# Patient Record
Sex: MALE | Race: WHITE | HISPANIC OR LATINO | URBAN - METROPOLITAN AREA
[De-identification: names, ages, dates, MRNs, and addresses within clinical notes are randomized per-mention and may not be internally consistent; named-entity substitution may affect disease eponyms.]

---

## 2021-04-01 ENCOUNTER — OUTPATIENT (OUTPATIENT)
Dept: OUTPATIENT SERVICES | Facility: HOSPITAL | Age: 21
LOS: 1 days | End: 2021-04-01
Payer: MEDICARE

## 2021-04-01 DIAGNOSIS — Z20.828 CONTACT WITH AND (SUSPECTED) EXPOSURE TO OTHER VIRAL COMMUNICABLE DISEASES: ICD-10-CM

## 2021-04-01 LAB — SARS-COV-2 RNA SPEC QL NAA+PROBE: SIGNIFICANT CHANGE UP

## 2021-04-01 PROCEDURE — C9803: CPT

## 2021-04-01 PROCEDURE — U0005: CPT

## 2021-04-01 PROCEDURE — U0003: CPT

## 2021-04-02 DIAGNOSIS — Z20.828 CONTACT WITH AND (SUSPECTED) EXPOSURE TO OTHER VIRAL COMMUNICABLE DISEASES: ICD-10-CM

## 2021-08-20 PROBLEM — Z00.00 ENCOUNTER FOR PREVENTIVE HEALTH EXAMINATION: Status: ACTIVE | Noted: 2021-08-20

## 2021-08-23 ENCOUNTER — NON-APPOINTMENT (OUTPATIENT)
Age: 21
End: 2021-08-23

## 2021-08-23 ENCOUNTER — APPOINTMENT (OUTPATIENT)
Dept: DERMATOLOGY | Facility: CLINIC | Age: 21
End: 2021-08-23
Payer: MEDICAID

## 2021-08-23 PROCEDURE — 99204 OFFICE O/P NEW MOD 45 MIN: CPT

## 2021-08-23 NOTE — ASSESSMENT
[FreeTextEntry1] : Acne\par prominent closed comedonal component;\par \par Therapeutic options and their risks and benefits; along with multiple diagnostic possibilities were discussed at length; risks and benefits of further study were discussed;\par \par Risks and benefits of minocycline were discussed including dizziness;\par  BID;  tretinoin 0.05 cream qHS;  \par \par f/u 6-8 wks;  t/c acne surgery; \par

## 2021-09-03 ENCOUNTER — NON-APPOINTMENT (OUTPATIENT)
Age: 21
End: 2021-09-03

## 2021-10-04 ENCOUNTER — APPOINTMENT (OUTPATIENT)
Dept: DERMATOLOGY | Facility: CLINIC | Age: 21
End: 2021-10-04
Payer: MEDICAID

## 2021-10-04 VITALS — BODY MASS INDEX: 21.47 KG/M2 | HEIGHT: 70 IN | WEIGHT: 150 LBS

## 2021-10-04 DIAGNOSIS — Z78.9 OTHER SPECIFIED HEALTH STATUS: ICD-10-CM

## 2021-10-04 PROCEDURE — 99214 OFFICE O/P EST MOD 30 MIN: CPT

## 2021-10-04 NOTE — ASSESSMENT
[FreeTextEntry1] : Acne;  improved, but still active\par \par Therapeutic options and their risks and benefits; along with multiple diagnostic possibilities were discussed at length; risks and benefits of further study were discussed;\par \par proper use of tretinoin discussed;  continue MCN; \par f/u 6-8 wks; repeat Tx; \par \par

## 2021-10-04 NOTE — HISTORY OF PRESENT ILLNESS
[de-identified] : f/u for check of acne; \par on MCN BID, tretinoin 0.05; (variable)\par still c/o activity;

## 2021-11-17 ENCOUNTER — APPOINTMENT (OUTPATIENT)
Dept: DERMATOLOGY | Facility: CLINIC | Age: 21
End: 2021-11-17
Payer: MEDICAID

## 2021-11-17 PROCEDURE — 99214 OFFICE O/P EST MOD 30 MIN: CPT

## 2021-11-17 NOTE — ASSESSMENT
[FreeTextEntry1] : Acne;  improved, but still active\par \par Therapeutic options and their risks and benefits; along with multiple diagnostic possibilities were discussed at length; risks and benefits of further study were discussed;\par \par  continue MCN; tretinoin;\par comedones decreasing, PIH will fade\par \par f/u 6-8 wks; repeat Tx; \par \par

## 2022-01-09 ENCOUNTER — OUTPATIENT (OUTPATIENT)
Dept: OUTPATIENT SERVICES | Facility: HOSPITAL | Age: 22
LOS: 1 days | End: 2022-01-09
Payer: MEDICAID

## 2022-01-09 DIAGNOSIS — Z20.828 CONTACT WITH AND (SUSPECTED) EXPOSURE TO OTHER VIRAL COMMUNICABLE DISEASES: ICD-10-CM

## 2022-01-09 LAB — SARS-COV-2 RNA SPEC QL NAA+PROBE: SIGNIFICANT CHANGE UP

## 2022-01-09 PROCEDURE — C9803: CPT

## 2022-01-09 PROCEDURE — U0005: CPT

## 2022-01-09 PROCEDURE — U0003: CPT

## 2022-01-10 DIAGNOSIS — Z20.828 CONTACT WITH AND (SUSPECTED) EXPOSURE TO OTHER VIRAL COMMUNICABLE DISEASES: ICD-10-CM

## 2022-01-26 ENCOUNTER — APPOINTMENT (OUTPATIENT)
Dept: DERMATOLOGY | Facility: CLINIC | Age: 22
End: 2022-01-26
Payer: MEDICAID

## 2022-01-26 PROCEDURE — 99213 OFFICE O/P EST LOW 20 MIN: CPT

## 2022-01-26 NOTE — ASSESSMENT
[FreeTextEntry1] : Acne;  markedly improved\par \par Therapeutic options and their risks and benefits; along with multiple diagnostic possibilities were discussed at length; risks and benefits of further study were discussed;\par \par  continue MCN; tretinoin;\par reduce MCN to 100/d;  \par f/u Spring;  may discuss keep at low dose MCN, tretinoin vs. replace w/topical\par \par \par \par

## 2022-03-02 ENCOUNTER — RX RENEWAL (OUTPATIENT)
Age: 22
End: 2022-03-02

## 2022-04-27 ENCOUNTER — APPOINTMENT (OUTPATIENT)
Dept: DERMATOLOGY | Facility: CLINIC | Age: 22
End: 2022-04-27
Payer: MEDICAID

## 2022-04-27 PROCEDURE — 99214 OFFICE O/P EST MOD 30 MIN: CPT

## 2022-04-27 NOTE — ASSESSMENT
[FreeTextEntry1] : Acne;  markedly improved\par \par Therapeutic options and their risks and benefits; along with multiple diagnostic possibilities were discussed at length; risks and benefits of further study were discussed;\par \par  continue MCN; tretinoin;\par continue MCN to 100/d;  \par f/u 3-4mo ;  may discuss keep at low dose MCN, tretinoin vs. replace w/topical\par \par Watch shaving in neck area, may be having shaving folliculitis overlap w/acne\par \par \par \par

## 2022-04-27 NOTE — PHYSICAL EXAM
[FreeTextEntry3] :  inflammatory acne;\par less comedones than prior- faded PIH\par some active lesions on neck, under jawline

## 2022-04-27 NOTE — HISTORY OF PRESENT ILLNESS
[de-identified] : f/u for check of acne; \par on MCN qd, occasionally BID; tretinoin 0.05; \par doing much better- although c/o recent outbreaks on neck area

## 2022-07-27 ENCOUNTER — APPOINTMENT (OUTPATIENT)
Dept: DERMATOLOGY | Facility: CLINIC | Age: 22
End: 2022-07-27

## 2022-07-27 PROCEDURE — 99214 OFFICE O/P EST MOD 30 MIN: CPT

## 2022-07-27 NOTE — ASSESSMENT
[FreeTextEntry1] : Acne;  markedly improved\par \par Therapeutic options and their risks and benefits; along with multiple diagnostic possibilities were discussed at length; risks and benefits of further study were discussed;\par \par  continue tretinoin;\par continue /d;  \par f/u 3-4mo ;  discussed taking trials off MCN as needed; may discuss keep at low dose MCN, tretinoin vs. replace w/topical\par \par

## 2022-07-27 NOTE — HISTORY OF PRESENT ILLNESS
[de-identified] : f/u for check of acne; \par on MCN qd, occasionally BID; tretinoin 0.05; \par doing much better-

## 2022-08-01 ENCOUNTER — RX RENEWAL (OUTPATIENT)
Age: 22
End: 2022-08-01

## 2022-12-02 ENCOUNTER — APPOINTMENT (OUTPATIENT)
Dept: DERMATOLOGY | Facility: CLINIC | Age: 22
End: 2022-12-02

## 2022-12-02 VITALS — HEIGHT: 70 IN | WEIGHT: 150 LBS | BODY MASS INDEX: 21.47 KG/M2

## 2022-12-02 PROCEDURE — 99213 OFFICE O/P EST LOW 20 MIN: CPT

## 2022-12-02 RX ORDER — AZITHROMYCIN 250 MG/1
250 TABLET, FILM COATED ORAL
Qty: 6 | Refills: 0 | Status: COMPLETED | COMMUNITY
Start: 2022-07-08

## 2022-12-02 RX ORDER — FLUTICASONE PROPIONATE 50 UG/1
50 SPRAY, METERED NASAL
Qty: 16 | Refills: 0 | Status: COMPLETED | COMMUNITY
Start: 2022-07-28

## 2022-12-02 RX ORDER — BENZONATATE 200 MG/1
200 CAPSULE ORAL
Qty: 30 | Refills: 0 | Status: COMPLETED | COMMUNITY
Start: 2022-07-01

## 2022-12-02 NOTE — HISTORY OF PRESENT ILLNESS
[de-identified] : f/u for check of acne; \par on MCN qd, occasionally BID; tretinoin 0.05; \par doing much better-

## 2022-12-02 NOTE — ASSESSMENT
[FreeTextEntry1] : Acne;  markedly improved\par \par Therapeutic options and their risks and benefits; along with multiple diagnostic possibilities were discussed at length; risks and benefits of further study were discussed;\par \par  continue tretinoin;\par continue /d;  \par f/u 3-4mo ; stable on current regimen; \par

## 2023-02-22 ENCOUNTER — RX RENEWAL (OUTPATIENT)
Age: 23
End: 2023-02-22

## 2023-04-21 ENCOUNTER — APPOINTMENT (OUTPATIENT)
Dept: DERMATOLOGY | Facility: CLINIC | Age: 23
End: 2023-04-21
Payer: MEDICAID

## 2023-04-21 PROCEDURE — 99214 OFFICE O/P EST MOD 30 MIN: CPT

## 2023-04-21 NOTE — HISTORY OF PRESENT ILLNESS
[de-identified] : f/u for check of acne; \par on MCN qd, occasionally BID; tretinoin 0.05; \par doing much better-

## 2023-07-04 ENCOUNTER — RX RENEWAL (OUTPATIENT)
Age: 23
End: 2023-07-04

## 2023-09-01 ENCOUNTER — APPOINTMENT (OUTPATIENT)
Dept: DERMATOLOGY | Facility: CLINIC | Age: 23
End: 2023-09-01
Payer: MEDICAID

## 2023-09-01 PROCEDURE — 99214 OFFICE O/P EST MOD 30 MIN: CPT

## 2023-09-01 RX ORDER — TRETINOIN 0.5 MG/G
0.05 CREAM TOPICAL
Qty: 1 | Refills: 3 | Status: ACTIVE | COMMUNITY
Start: 2023-09-01 | End: 1900-01-01

## 2023-09-01 RX ORDER — TRETINOIN 0.5 MG/G
0.05 CREAM TOPICAL
Qty: 20 | Refills: 3 | Status: DISCONTINUED | COMMUNITY
Start: 2021-08-23 | End: 2023-09-01

## 2023-09-01 NOTE — HISTORY OF PRESENT ILLNESS
[de-identified] : f/u for check of acne;  on MCN qd, occasionally BID; tretinoin 0.05;  doing much better-

## 2023-09-01 NOTE — ASSESSMENT
[FreeTextEntry1] : Acne;  markedly improved  Therapeutic options and their risks and benefits; along with multiple diagnostic possibilities were discussed at length; risks and benefits of further study were discussed;   continue tretinoin; (Retin A darrell) continue /d;  taking since 2021 f/u 3-4mo ; stable on current regimen;

## 2023-11-08 ENCOUNTER — RX RENEWAL (OUTPATIENT)
Age: 23
End: 2023-11-08

## 2024-01-13 ENCOUNTER — APPOINTMENT (OUTPATIENT)
Dept: DERMATOLOGY | Facility: CLINIC | Age: 24
End: 2024-01-13
Payer: MEDICAID

## 2024-01-13 VITALS — WEIGHT: 150 LBS | HEIGHT: 65 IN | BODY MASS INDEX: 24.99 KG/M2

## 2024-01-13 PROCEDURE — 99214 OFFICE O/P EST MOD 30 MIN: CPT

## 2024-01-13 NOTE — ASSESSMENT
[FreeTextEntry1] : Acne; markedly improved  Therapeutic options and their risks and benefits; along with multiple diagnostic possibilities were discussed at length; risks and benefits of further study were discussed;   continue tretinoin; (Retin TEENA ramírez) continue /d; taking since 2021 f/u 3-4mo ; stable on current regimen;.

## 2024-01-13 NOTE — HISTORY OF PRESENT ILLNESS
[de-identified] : f/u for check of acne;  on MCN qd, occasionally BID; tretinoin 0.05;  doing much better-

## 2024-02-09 ENCOUNTER — RX RENEWAL (OUTPATIENT)
Age: 24
End: 2024-02-09

## 2024-02-09 RX ORDER — MINOCYCLINE HYDROCHLORIDE 100 MG/1
100 CAPSULE ORAL
Qty: 60 | Refills: 2 | Status: ACTIVE | COMMUNITY
Start: 2021-08-23 | End: 1900-01-01

## 2024-03-14 ENCOUNTER — APPOINTMENT (OUTPATIENT)
Dept: DERMATOLOGY | Facility: CLINIC | Age: 24
End: 2024-03-14
Payer: MEDICAID

## 2024-03-14 DIAGNOSIS — L20.9 ATOPIC DERMATITIS, UNSPECIFIED: ICD-10-CM

## 2024-03-14 PROCEDURE — 99214 OFFICE O/P EST MOD 30 MIN: CPT

## 2024-03-14 RX ORDER — MOMETASONE FUROATE 1 MG/G
0.1 CREAM TOPICAL
Qty: 1 | Refills: 2 | Status: ACTIVE | COMMUNITY
Start: 2024-03-14 | End: 1900-01-01

## 2024-03-14 NOTE — HISTORY OF PRESENT ILLNESS
[de-identified] : The patient has been fit in for urgent appointment.  c/o itchy rash on right thigh, tried to use acne treatments but did not respond some on left

## 2024-03-14 NOTE — PHYSICAL EXAM
[FreeTextEntry3] : Right anterior thigh:  + Erythematous scaling patches with inflammation - follicular based  similar changes on left side

## 2024-03-14 NOTE — ASSESSMENT
[FreeTextEntry1] : Eczema/ LSC follicular component   Therapeutic options and their risks and benefits; along with multiple diagnostic possibilities were discussed at length; risks and benefits of further study were discussed;  mometasone cream BID x 1-2 weeks f/u if persists  has f/u in May to check acne;  t/c discussing isotretinoin for persistent face/back acne;

## 2024-05-22 ENCOUNTER — APPOINTMENT (OUTPATIENT)
Dept: DERMATOLOGY | Facility: CLINIC | Age: 24
End: 2024-05-22
Payer: COMMERCIAL

## 2024-05-22 DIAGNOSIS — L70.0 ACNE VULGARIS: ICD-10-CM

## 2024-05-22 PROCEDURE — 99215 OFFICE O/P EST HI 40 MIN: CPT

## 2024-05-22 NOTE — ASSESSMENT
[FreeTextEntry1] : Acne;  Persistent, on long term MCN;  Risks and benefits of  isotretinoin were discussed including dryness, arthralgias,, epistaxis, initial worsening of acne, and need for laboratory monitoring. Concerns regarding depression, suicide, and other psychological issues were discussed.   d/c MCN,  start isotretinoin @ 40 BID (wt 150 lb) discussed at length check labs 1 mo

## 2024-05-22 NOTE — HISTORY OF PRESENT ILLNESS
[de-identified] : f/u for check of acne;  on MCN qd, occasionally BID; tretinoin 0.05;  still with persistent outbreaks, especially on back inquiring re: other options

## 2024-06-10 RX ORDER — ISOTRETINOIN 40 MG/1
40 CAPSULE ORAL
Qty: 60 | Refills: 0 | Status: ACTIVE | COMMUNITY
Start: 2024-05-22

## 2024-06-21 ENCOUNTER — APPOINTMENT (OUTPATIENT)
Dept: DERMATOLOGY | Facility: CLINIC | Age: 24
End: 2024-06-21

## 2024-07-19 ENCOUNTER — APPOINTMENT (OUTPATIENT)
Dept: DERMATOLOGY | Facility: CLINIC | Age: 24
End: 2024-07-19
Payer: COMMERCIAL

## 2024-07-19 ENCOUNTER — TRANSCRIPTION ENCOUNTER (OUTPATIENT)
Age: 24
End: 2024-07-19

## 2024-07-19 DIAGNOSIS — L70.0 ACNE VULGARIS: ICD-10-CM

## 2024-07-19 PROCEDURE — 99214 OFFICE O/P EST MOD 30 MIN: CPT

## 2024-08-15 ENCOUNTER — NON-APPOINTMENT (OUTPATIENT)
Age: 24
End: 2024-08-15

## 2024-08-16 ENCOUNTER — APPOINTMENT (OUTPATIENT)
Dept: DERMATOLOGY | Facility: CLINIC | Age: 24
End: 2024-08-16
Payer: COMMERCIAL

## 2024-08-16 DIAGNOSIS — L70.0 ACNE VULGARIS: ICD-10-CM

## 2024-08-16 PROCEDURE — 99214 OFFICE O/P EST MOD 30 MIN: CPT

## 2024-08-16 NOTE — HISTORY OF PRESENT ILLNESS
[de-identified] : Isotretinoin visit:  The patient has been on a daily dose of: 80   mg for  2  months.  doing well, mild initial flare on forehead   having expected AE

## 2024-08-16 NOTE — ASSESSMENT
[FreeTextEntry1] : isotretinoin;  continue at 80/d mild initial flare Risks and benefits of  isotretinoin were discussed including dryness, arthralgias,, epistaxis, initial worsening of acne, and need for laboratory monitoring. Concerns regarding depression, suicide, and other psychological issues were discussed.  labs pending f/u 1 month, recheck labs  acne surgery to forehead, temples

## 2024-09-13 ENCOUNTER — APPOINTMENT (OUTPATIENT)
Dept: DERMATOLOGY | Facility: CLINIC | Age: 24
End: 2024-09-13
Payer: COMMERCIAL

## 2024-09-13 DIAGNOSIS — L70.0 ACNE VULGARIS: ICD-10-CM

## 2024-09-13 PROCEDURE — 99214 OFFICE O/P EST MOD 30 MIN: CPT

## 2024-09-13 NOTE — HISTORY OF PRESENT ILLNESS
[de-identified] : Isotretinoin visit:  The patient has been on a daily dose of: 80   mg for  3 months.  doing well, mild initial flare on forehead   having expected AE

## 2024-09-13 NOTE — ASSESSMENT
[FreeTextEntry1] : isotretinoin;  continue at 80/d- 2 more mos mild initial flare Risks and benefits of  isotretinoin were discussed including dryness, arthralgias,, epistaxis, initial worsening of acne, and need for laboratory monitoring. Concerns regarding depression, suicide, and other psychological issues were discussed.  labs NL f/u 1 month, no further labs  acne surgery to forehead, - still some persistent comedones;

## 2024-10-08 ENCOUNTER — NON-APPOINTMENT (OUTPATIENT)
Age: 24
End: 2024-10-08

## 2024-10-11 ENCOUNTER — APPOINTMENT (OUTPATIENT)
Dept: DERMATOLOGY | Facility: CLINIC | Age: 24
End: 2024-10-11
Payer: COMMERCIAL

## 2024-10-11 VITALS — WEIGHT: 150 LBS | BODY MASS INDEX: 21.47 KG/M2 | HEIGHT: 70 IN

## 2024-10-11 DIAGNOSIS — L70.0 ACNE VULGARIS: ICD-10-CM

## 2024-10-11 PROCEDURE — 99214 OFFICE O/P EST MOD 30 MIN: CPT

## 2024-11-15 ENCOUNTER — APPOINTMENT (OUTPATIENT)
Dept: DERMATOLOGY | Facility: CLINIC | Age: 24
End: 2024-11-15
Payer: COMMERCIAL

## 2024-11-15 DIAGNOSIS — L70.0 ACNE VULGARIS: ICD-10-CM

## 2024-11-15 PROCEDURE — 99214 OFFICE O/P EST MOD 30 MIN: CPT

## 2025-03-21 ENCOUNTER — APPOINTMENT (OUTPATIENT)
Dept: DERMATOLOGY | Facility: CLINIC | Age: 25
End: 2025-03-21
Payer: COMMERCIAL

## 2025-03-21 VITALS — WEIGHT: 150 LBS | HEIGHT: 70 IN | BODY MASS INDEX: 21.47 KG/M2

## 2025-03-21 DIAGNOSIS — L70.0 ACNE VULGARIS: ICD-10-CM

## 2025-03-21 PROCEDURE — 99214 OFFICE O/P EST MOD 30 MIN: CPT

## 2025-07-24 ENCOUNTER — APPOINTMENT (OUTPATIENT)
Dept: ORTHOPEDIC SURGERY | Facility: CLINIC | Age: 25
End: 2025-07-24
Payer: COMMERCIAL

## 2025-07-24 VITALS — HEIGHT: 65 IN | WEIGHT: 158 LBS | BODY MASS INDEX: 26.33 KG/M2

## 2025-07-24 DIAGNOSIS — S86.892A OTHER INJURY OF OTHER MUSCLE(S) AND TENDON(S) AT LOWER LEG LEVEL, LEFT LEG, INITIAL ENCOUNTER: ICD-10-CM

## 2025-07-24 PROCEDURE — 73590 X-RAY EXAM OF LOWER LEG: CPT | Mod: LT

## 2025-07-24 PROCEDURE — 99203 OFFICE O/P NEW LOW 30 MIN: CPT | Mod: 25

## 2025-07-28 PROBLEM — S86.892A LEFT MEDIAL TIBIAL STRESS SYNDROME, INITIAL ENCOUNTER: Status: ACTIVE | Noted: 2025-07-24

## 2025-08-01 ENCOUNTER — EMERGENCY (EMERGENCY)
Facility: HOSPITAL | Age: 25
LOS: 0 days | Discharge: HOME | End: 2025-08-01
Attending: EMERGENCY MEDICINE
Payer: COMMERCIAL

## 2025-08-01 VITALS
SYSTOLIC BLOOD PRESSURE: 144 MMHG | HEART RATE: 92 BPM | TEMPERATURE: 99 F | OXYGEN SATURATION: 100 % | WEIGHT: 164.02 LBS | DIASTOLIC BLOOD PRESSURE: 62 MMHG | RESPIRATION RATE: 18 BRPM

## 2025-08-01 VITALS
TEMPERATURE: 100 F | DIASTOLIC BLOOD PRESSURE: 66 MMHG | RESPIRATION RATE: 19 BRPM | SYSTOLIC BLOOD PRESSURE: 125 MMHG | OXYGEN SATURATION: 100 % | HEART RATE: 78 BPM

## 2025-08-01 DIAGNOSIS — R21 RASH AND OTHER NONSPECIFIC SKIN ERUPTION: ICD-10-CM

## 2025-08-01 DIAGNOSIS — A53.9 SYPHILIS, UNSPECIFIED: ICD-10-CM

## 2025-08-01 DIAGNOSIS — R07.89 OTHER CHEST PAIN: ICD-10-CM

## 2025-08-01 DIAGNOSIS — R06.02 SHORTNESS OF BREATH: ICD-10-CM

## 2025-08-01 LAB
ALBUMIN SERPL ELPH-MCNC: 3.6 G/DL — SIGNIFICANT CHANGE UP (ref 3.3–5)
ALP SERPL-CCNC: 88 U/L — SIGNIFICANT CHANGE UP (ref 40–120)
ALT FLD-CCNC: 22 U/L — SIGNIFICANT CHANGE UP (ref 12–78)
ANION GAP SERPL CALC-SCNC: 5 MMOL/L — SIGNIFICANT CHANGE UP (ref 5–17)
AST SERPL-CCNC: 19 U/L — SIGNIFICANT CHANGE UP (ref 15–37)
BASOPHILS # BLD AUTO: 0.02 K/UL — SIGNIFICANT CHANGE UP (ref 0–0.2)
BASOPHILS NFR BLD AUTO: 0.3 % — SIGNIFICANT CHANGE UP (ref 0–2)
BILIRUB SERPL-MCNC: 0.4 MG/DL — SIGNIFICANT CHANGE UP (ref 0.2–1.2)
BUN SERPL-MCNC: 24 MG/DL — HIGH (ref 7–23)
CALCIUM SERPL-MCNC: 8.9 MG/DL — SIGNIFICANT CHANGE UP (ref 8.5–10.1)
CHLORIDE SERPL-SCNC: 108 MMOL/L — SIGNIFICANT CHANGE UP (ref 96–108)
CO2 SERPL-SCNC: 28 MMOL/L — SIGNIFICANT CHANGE UP (ref 22–31)
CREAT SERPL-MCNC: 1.05 MG/DL — SIGNIFICANT CHANGE UP (ref 0.5–1.3)
EGFR: 102 ML/MIN/1.73M2 — SIGNIFICANT CHANGE UP
EGFR: 102 ML/MIN/1.73M2 — SIGNIFICANT CHANGE UP
EOSINOPHIL # BLD AUTO: 0.03 K/UL — SIGNIFICANT CHANGE UP (ref 0–0.5)
EOSINOPHIL NFR BLD AUTO: 0.5 % — SIGNIFICANT CHANGE UP (ref 0–6)
GLUCOSE SERPL-MCNC: 80 MG/DL — SIGNIFICANT CHANGE UP (ref 70–99)
HCT VFR BLD CALC: 45.2 % — SIGNIFICANT CHANGE UP (ref 39–50)
HGB BLD-MCNC: 14.7 G/DL — SIGNIFICANT CHANGE UP (ref 13–17)
IMM GRANULOCYTES # BLD AUTO: 0.01 K/UL — SIGNIFICANT CHANGE UP (ref 0–0.07)
IMM GRANULOCYTES NFR BLD AUTO: 0.2 % — SIGNIFICANT CHANGE UP (ref 0–0.9)
LYMPHOCYTES # BLD AUTO: 1.3 K/UL — SIGNIFICANT CHANGE UP (ref 1–3.3)
LYMPHOCYTES NFR BLD AUTO: 20.7 % — SIGNIFICANT CHANGE UP (ref 13–44)
MCHC RBC-ENTMCNC: 26.4 PG — LOW (ref 27–34)
MCHC RBC-ENTMCNC: 32.5 G/DL — SIGNIFICANT CHANGE UP (ref 32–36)
MCV RBC AUTO: 81.3 FL — SIGNIFICANT CHANGE UP (ref 80–100)
MONOCYTES # BLD AUTO: 0.6 K/UL — SIGNIFICANT CHANGE UP (ref 0–0.9)
MONOCYTES NFR BLD AUTO: 9.5 % — SIGNIFICANT CHANGE UP (ref 2–14)
NEUTROPHILS # BLD AUTO: 4.33 K/UL — SIGNIFICANT CHANGE UP (ref 1.8–7.4)
NEUTROPHILS NFR BLD AUTO: 68.8 % — SIGNIFICANT CHANGE UP (ref 43–77)
NRBC # BLD AUTO: 0 K/UL — SIGNIFICANT CHANGE UP (ref 0–0)
NRBC # FLD: 0 K/UL — SIGNIFICANT CHANGE UP (ref 0–0)
NRBC BLD AUTO-RTO: 0 /100 WBCS — SIGNIFICANT CHANGE UP (ref 0–0)
PLATELET # BLD AUTO: 230 K/UL — SIGNIFICANT CHANGE UP (ref 150–400)
PMV BLD: 8.7 FL — SIGNIFICANT CHANGE UP (ref 7–13)
POTASSIUM SERPL-MCNC: 4 MMOL/L — SIGNIFICANT CHANGE UP (ref 3.5–5.3)
POTASSIUM SERPL-SCNC: 4 MMOL/L — SIGNIFICANT CHANGE UP (ref 3.5–5.3)
PROT SERPL-MCNC: 8 GM/DL — SIGNIFICANT CHANGE UP (ref 6–8.3)
RBC # BLD: 5.56 M/UL — SIGNIFICANT CHANGE UP (ref 4.2–5.8)
RBC # FLD: 12 % — SIGNIFICANT CHANGE UP (ref 10.3–14.5)
SODIUM SERPL-SCNC: 141 MMOL/L — SIGNIFICANT CHANGE UP (ref 135–145)
TROPONIN I, HIGH SENSITIVITY RESULT: 3.27 NG/L — SIGNIFICANT CHANGE UP
WBC # BLD: 6.29 K/UL — SIGNIFICANT CHANGE UP (ref 3.8–10.5)
WBC # FLD AUTO: 6.29 K/UL — SIGNIFICANT CHANGE UP (ref 3.8–10.5)

## 2025-08-01 PROCEDURE — 71046 X-RAY EXAM CHEST 2 VIEWS: CPT

## 2025-08-01 PROCEDURE — 80053 COMPREHEN METABOLIC PANEL: CPT

## 2025-08-01 PROCEDURE — 93010 ELECTROCARDIOGRAM REPORT: CPT

## 2025-08-01 PROCEDURE — 85025 COMPLETE CBC W/AUTO DIFF WBC: CPT

## 2025-08-01 PROCEDURE — 71046 X-RAY EXAM CHEST 2 VIEWS: CPT | Mod: 26

## 2025-08-01 PROCEDURE — 99285 EMERGENCY DEPT VISIT HI MDM: CPT | Mod: 25

## 2025-08-01 PROCEDURE — 96372 THER/PROPH/DIAG INJ SC/IM: CPT

## 2025-08-01 PROCEDURE — 84484 ASSAY OF TROPONIN QUANT: CPT

## 2025-08-01 PROCEDURE — 36415 COLL VENOUS BLD VENIPUNCTURE: CPT

## 2025-08-01 PROCEDURE — 93005 ELECTROCARDIOGRAM TRACING: CPT

## 2025-08-01 PROCEDURE — 99285 EMERGENCY DEPT VISIT HI MDM: CPT

## 2025-08-01 PROCEDURE — 87389 HIV-1 AG W/HIV-1&-2 AB AG IA: CPT

## 2025-08-01 RX ORDER — PENICILLIN G BENZATHINE 2.4MM/4ML
2.4 SYRINGE (ML) INTRAMUSCULAR ONCE
Refills: 0 | Status: COMPLETED | OUTPATIENT
Start: 2025-08-01 | End: 2025-08-01

## 2025-08-01 RX ADMIN — Medication 2.4 MILLION UNIT(S): at 21:19

## 2025-08-01 NOTE — ED STATDOCS - PHYSICAL EXAMINATION
see mdm see mdm    PA NOTE: GEN: AOX3, NAD. HEENT: Throat clear. Airway is patent. EYES: PERRLA. EOMI. Head: NC/AT. NECK: Supple, No JVD. FROM. C-spine non-tender. CV:S1S2, RRR, LUNGS: Non-labored breathing, no tachypnea. O2sat 100% RA. CTA b/l. No w/r/r. CHEST: Equal chest expansion and rise. No deformity. ABD: Soft, NT/ND, no rebound, no guarding. No CVAT. EXT: No e/c/c. 2+ distal pulses. SKIN: No rashes. NEURO: No focal deficits. CN II-XII intact. FROM. 5/5 motor and sensory. ~Ed Smith PA-C

## 2025-08-01 NOTE — ED STATDOCS - PROGRESS NOTE DETAILS
PA note: All labwork/radiology results discussed in detail with patient. Patient re-examined and re-evaluated. Patient feels much better at this time. ED evaluation, Diagnosis and management discussed with the patient in detail. Workup results discussed with ED attending, OK to PA home. Close Urology/ID MD follow up encouraged, aftercare to assist with scheduling appointment ASAP. Strict ED return instructions discussed in detail and patient given the opportunity to ask any questions about their discharge diagnosis and instructions. Patient verbalized understanding. ~ Ed Smith PA-C

## 2025-08-01 NOTE — ED STATDOCS - NS_ ATTENDINGSCRIBEDETAILS _ED_A_ED_FT
The history, relevant review of systems, past medical and surgical history, medical decision making, and physical examination was documented by the scribe in my presence and I attest to the accuracy of the documentation (Savage Lamas M.D.).

## 2025-08-01 NOTE — ED STATDOCS - CARE PROVIDER_API CALL
Rai Ruiz ()  Urology  [address] [place] [address]  Phone: [phone]  Fax: [phone]  Follow Up Time: Urgent    Emma, Mert Albarran ()  Infectious Disease  [address] [place] [address], [address] [place] [id number]  Phone: [phone]  Fax: [phone]  Follow Up Time: Urgent

## 2025-08-01 NOTE — ED STATDOCS - CLINICAL SUMMARY MEDICAL DECISION MAKING FREE TEXT BOX
26 y/o M with no significant pmhx. Pt has sexual history of men who have sex with men 3 weeks ago c/o rash all over body. Pt saw dermatologist, RPR titer and treponema antibody that was reactive. Came in after dermatologist had concern for syphilis. Endorses chest pain and sob x3 days. No fever, chills, n/v, abdominal pain. Vitals within normal limits. EKG pending    General: NAD  HEENT: NCAT. Non erythematous, non swollen tonsils. No exudates.  Cardiac: RRR, no murmurs, well perfused extremities. equal radial pulses  Chest: CTA. No crackles/wheezing   Abdomen: soft, non-distended, no ttp, no rebound or guarding  Extremities: no peripheral edema, calf tenderness, or leg size discrepancies  Skin: small macular lesions .5cm in diameter on base of penis, not painful to touch, not raised, and also a few of the rashes noted on the body  Neuro: AAOx3, motor and sensory grossly intact. CN II-XII intact. 5/5 strength upper and lower extremities. Normal sensation bilaterally upper and lower extremities. Normal gait.  Psych: mood and affect appropriate    Concern at this time for syphilis. Plan penicillin IM shot, ekg, labs, troponin. Also screen for hiv. Most likely d/c with infectious disease and dermatology follow up.

## 2025-08-01 NOTE — ED STATDOCS - NSFOLLOWUPINSTRUCTIONS_ED_ALL_ED_FT
Syphilis  Syphilis is a curable infection that can spread through sexual contact. It is important to get treatment right away to reduce the possibility of serious complications.    There are four stages of syphilis:  Primary stage. During this stage, sores may form where the disease entered your body.  Secondary stage. During this stage, skin rashes and lesions will form.  Latent stage. During this stage, there are no symptoms, but the infection may still be contagious.  Tertiary stage. This stage happens 5–30 years after the infection starts. During this stage, the disease damages organs and can lead to death. Most people with treated syphilis do not develop this stage.  What are the causes?  This condition is caused by bacteria called Treponema pallidum. The condition can spread during sexual activity, such as during oral, anal, or vaginal sex. It can also be spread to an unborn baby (fetus) during pregnancy.    What increases the risk?  You are more likely to develop this condition if:  You do not use a condom during sex.  You have sex with a partner who has syphilis.  You have a history of sexually transmitted infections (STIs).  You use recreational drugs such as methamphetamines, injection drugs, or heroin.  What are the signs or symptoms?  Symptoms of this condition depend on the stage of the disease.    Primary stage    One or more painless sores (chancres) in and around the genital organs, mouth, or hands. The sores are usually firm and round.  Secondary stage    Skin rashes or sores (or both) in the mouth, vagina, anus, palms of the hands, or bottoms of the feet. The rash may be rough, red or reddish-brown, and may not itch.  Other symptoms may include:  A fever.  Swollen lymph glands.  A sore throat.  Patchy hair loss.  A headache.  A feeling of being ill or very tired.  Weight loss.  Latent stage    There are no symptoms during this stage.    Tertiary stage    This stage is rarely noted due to the use of antibiotic medicine to treat syphilis. However, without treatment, syphilis can further affect different organ systems, especially the heart and nervous system.  Signs and symptoms impacting the heart include:  Enlargement of the heart.  Damage to the aorta.  Narrowing of the blood vessels around the heart.  Signs and symptoms impacting the nervous system include:  Meningitis.  Dysfunction of the nerves to the head.  Nonspecific growths may also occur in the skin, mucous membranes, bones, or body organs in this stage.    Any stage if there is no treatment    Signs and symptoms of impacting the nervous system include:  A severe headache.  Muscle weakness or difficulty coordinating movements, such as walking.  Changes in mental state, such as confusion, personality changes, or dementia.  Signs and symptoms of impacting the eyes include:  Eye pain.  Eye redness.  Changes in vision.  Blindness.  Signs and symptoms of impacting the ears include:  Hearing loss.  Ringing in the ears (tinnitus).  Dizziness or feeling like you or your surroundings are moving or spinning (vertigo).  How is this diagnosed?  A person having a blood sample taken from the arm.  This condition is diagnosed with:  A physical exam.  Blood tests, including:  Nontreponemal test. This is usually the first test. It can detect other kinds of antibodies as well.  Treponemal test. This test is done if you get a positive result on the nontreponemal test. It specifically looks for antibodies to syphilis. This test will not show whether antibodies are from a past syphilis infection or a current infection.  Tests of the fluid (drainage) from a sore or rash.  Tests of the fluid around the spine (lumbar puncture). These tests are done to check for an infection in the brain or nervous system in late-stage syphilis.  Imaging tests. These may be done to check for damage to the heart, aorta, or brain if the condition is in the tertiary stage. Tests may include:  X-ray.  CT scan.  MRI.  Echocardiogram. This test takes a picture of the heart.  How is this treated?  This condition can be cured with antibiotic medicine. It is important to receive treatment as soon as possible to get rid of the infection. However, the treatment may not undo any damage already caused by the infection.    Follow these instructions at home:  Medicines    A prescription pill bottle with an example of a pill.  Take over-the-counter and prescription medicines only as told by your health care provider.  Take your antibiotic medicine as told by your health care provider. Do not stop taking the antibiotic even if you start to feel better. Incomplete treatment will put you at risk for continued infection and could be life-threatening.  General instructions    Do not have sex until your treatment is completed, or as directed by your health care provider.  Tell your recent sexual partners that you were diagnosed with syphilis. It is important that they get treatment, even if they do not have symptoms.  Keep all follow-up visits. This is important.  How is this prevented?  Use latex or polyurethane condoms correctly whenever you have sex.  Before you have sex, ask your partner if they have been tested for STIs. Ask about the test results.  Avoid having multiple sexual partners.  Contact a health care provider if:  You continue to have any of the following symptoms 24 hours after beginning treatment:  Fever.  Chills.  Headache.  Nausea.  Aching all over your body.  Your symptoms do not improve, even with treatment.  Get help right away if:  You have severe chest pain.  You have trouble walking or coordinating movements.  You are confused.  You lose vision or hearing.  You have numbness in your arms or legs.  You have a seizure.  You faint.  You have a severe headache that does not go away with medicine.  These symptoms may be an emergency. Get help right away. Call 911.  Do not wait to see if the symptoms will go away.  Do not drive yourself to the hospital.  Summary  Syphilis is an infection that can spread through sexual contact or to a fetus during pregnancy.  This condition can cause serious complications, so it is best to get treatment right away. The condition can be cured with antibiotic medicine.  Take your antibiotic medicine as told by your health care provider.  Tell your recent sexual partners that you were diagnosed with syphilis. It is important that they get treatment, even if they do not have symptoms.  This information is not intended to replace advice given to you by your health care provider. Make sure you discuss any questions you have with your health care provider.    Nonspecific Chest Pain, Adult  Chest pain is an uncomfortable, tight, or painful feeling in the chest. The pain can feel like a crushing, aching, or squeezing pressure. A person can feel a burning or tingling sensation. Chest pain can also be felt in your back, neck, jaw, shoulder, or arm. This pain can be worse when you move, sneeze, or take a deep breath.    Chest pain can be caused by a condition that is life-threatening. This must be treated right away. It can also be caused by something that is not life-threatening. If you have chest pain, it can be hard to know the difference, so it is important to get help right away to make sure that you do not have a serious condition.    Some life-threatening causes of chest pain include:  Heart attack.  A tear in the body's main blood vessel (aortic dissection).  Inflammation around your heart (pericarditis).  A problem in the lungs, such as a blood clot (pulmonary embolism) or a collapsed lung (pneumothorax).  Some non life-threatening causes of chest pain include:  Heartburn.  Anxiety or stress.  Damage to the bones, muscles, and cartilage that make up your chest wall.  Pneumonia or bronchitis.  Shingles infection (varicella-zoster virus).  Your chest pain may come and go. It may also be constant. Your health care provider will do tests and other studies to find the cause of your pain. Treatment will depend on the cause of your chest pain.    Follow these instructions at home:  Medicines    Take over-the-counter and prescription medicines only as told by your health care provider.  If you were prescribed an antibiotic medicine, take it as told by your health care provider. Do not stop taking the antibiotic even if you start to feel better.  Activity    Avoid any activities that cause chest pain.  Do not lift anything that is heavier than 10 lb (4.5 kg), or the limit that you are told, until your health care provider says that it is safe.  Rest as directed by your health care provider.  Return to your normal activities only as told by your health care provider. Ask your health care provider what activities are safe for you.  Lifestyle    A plate along with examples of foods in a healthy diet.  Silhouette of a person sitting on the floor doing yoga.  Do not use any products that contain nicotine or tobacco, such as cigarettes, e-cigarettes, and chewing tobacco. If you need help quitting, ask your health care provider.  Do not drink alcohol.  Make healthy lifestyle changes as recommended. These may include:  Getting regular exercise. Ask your health care provider to suggest some exercises that are safe for you.  Eating a heart-healthy diet. This includes plenty of fresh fruits and vegetables, whole grains, low-fat (lean) protein, and low-fat dairy products. A dietitian can help you find healthy eating options.  Maintaining a healthy weight.  Managing any other health conditions you may have, such as high blood pressure (hypertension) or diabetes.  Reducing stress, such as with yoga or relaxation techniques.  General instructions    Pay attention to any changes in your symptoms.  It is up to you to get the results of any tests that were done. Ask your health care provider, or the department that is doing the tests, when your results will be ready.  Keep all follow-up visits as told by your health care provider. This is important.  You may be asked to go for further testing if your chest pain does not go away.  Contact a health care provider if:  Your chest pain does not go away.  You feel depressed.  You have a fever.  You notice changes in your symptoms or develop new symptoms.  Get help right away if:  Your chest pain gets worse.  You have a cough that gets worse, or you cough up blood.  You have severe pain in your abdomen.  You faint.  You have sudden, unexplained chest discomfort.  You have sudden, unexplained discomfort in your arms, back, neck, or jaw.  You have shortness of breath at any time.  You suddenly start to sweat, or your skin gets clammy.  You feel nausea or you vomit.  You suddenly feel lightheaded or dizzy.  You have severe weakness, or unexplained weakness or fatigue.  Your heart begins to beat quickly, or it feels like it is skipping beats.  These symptoms may represent a serious problem that is an emergency. Do not wait to see if the symptoms will go away. Get medical help right away. Call your local emergency services (911 in the [place] Do not drive yourself to the hospital.    Summary  Chest pain can be caused by a condition that is serious and requires urgent treatment. It may also be caused by something that is not life-threatening.  Your health care provider may do lab tests and other studies to find the cause of your pain.  Follow your health care provider's instructions on taking medicines, making lifestyle changes, and getting emergency treatment if symptoms become worse.  Keep all follow-up visits as told by your health care provider. This includes visits for any further testing if your chest pain does not go away.  This information is not intended to replace advice given to you by your health care provider. Make sure you discuss any questions you have with your health care provider.

## 2025-08-01 NOTE — ED ADULT NURSE NOTE - OBJECTIVE STATEMENT
Patient sent in by urgent care for positive syphilis result. c/o headache, feeling, hot, chest pressure, shortness of breath, and sore throat.

## 2025-08-01 NOTE — ED STATDOCS - PATIENT PORTAL LINK FT
You can access the FollowMyHealth Patient Portal offered by [facility] by registering at the following website: [email or web address]. By joining [employer]’s FollowMyHealth portal, you will also be able to view your health information using other applications (apps) compatible with our system.

## 2025-08-01 NOTE — ED STATDOCS - CARE PROVIDERS DIRECT ADDRESSES
,[email or web address]

## 2025-08-01 NOTE — ED STATDOCS - PROVIDER TOKENS
PROVIDER:[TOKEN:[[id number]],FOLLOWUP:[Urgent]],PROVIDER:[TOKEN:[[id number]],FOLLOWUP:[Urgent]]

## 2025-08-01 NOTE — ED STATDOCS - ATTENDING APP SHARED VISIT CONTRIBUTION OF CARE
I have personally seen and examined this patient. I have fully participated in the care of this patient. I have made amendments to the documentation where appropriate and otherwise agree with the history, physical exam, and plan as documented by the Resident/ACP/Student.     Attending Contribution to Care: Savage Lamas M.D.   I have personally performed a face to face medical and diagnostic evaluation of the patient. I have discussed with and reviewed the Resident's and/or ACP's and/or Medical/PA/NP student's note and agree with the History, ROS, Physical Exam and MDM unless otherwise indicated. A brief summary of my personal evaluation and impression can be found below and/or in mdm if I have documented anything in that section.

## 2025-08-01 NOTE — ED ADULT NURSE NOTE - IN ACCORDANCE WITH [place] LAW, WE OFFER EVERY PATIENT A HEPATITIS C TEST. WOULD YOU LIKE TO BE TESTED TODAY?
[Wound Care] : wound care Opt out [FreeTextEntry1] : right thigh wound -> healing -.  excisional debridement with scissors devitalized tissue to subcutaneous tissue    [id number] -> local wound care \par follow up 1 week

## 2025-08-02 LAB — HIV 1+2 AB+HIV1 P24 AG SERPL QL IA: SIGNIFICANT CHANGE UP

## 2025-08-04 DIAGNOSIS — Z78.9 OTHER SPECIFIED HEALTH STATUS: ICD-10-CM
